# Patient Record
Sex: FEMALE | Race: WHITE
[De-identification: names, ages, dates, MRNs, and addresses within clinical notes are randomized per-mention and may not be internally consistent; named-entity substitution may affect disease eponyms.]

---

## 2017-06-05 ENCOUNTER — HOSPITAL ENCOUNTER (OUTPATIENT)
Dept: HOSPITAL 62 - WI | Age: 67
End: 2017-06-05
Attending: INTERNAL MEDICINE
Payer: MEDICARE

## 2017-06-05 DIAGNOSIS — C50.412: Primary | ICD-10-CM

## 2017-06-05 PROCEDURE — G0279 TOMOSYNTHESIS, MAMMO: HCPCS

## 2017-06-05 PROCEDURE — G0204 DX MAMMO INCL CAD BI: HCPCS

## 2017-06-05 PROCEDURE — 77066 DX MAMMO INCL CAD BI: CPT

## 2017-06-05 PROCEDURE — 77062 BREAST TOMOSYNTHESIS BI: CPT

## 2017-06-16 NOTE — WOMENS IMAGING REPORT
EXAM DESCRIPTION:  3D DX MAMMO BILAT



COMPLETED DATE/TIME:  6/5/2017 2:06 pm



REASON FOR STUDY:  ROUTINE SCREENING Z12.31 C50.412  MALIG NEOPLASM OF UPPER-OUTER QUADRANT OF LEFT F
EMAL



COMPARISON:  11/13/2012, 1/29/2008



TECHNIQUE:  Standard craniocaudal and mediolateral oblique views of each breast recorded using digita
l acquisition and breast tomosynthesis.



LIMITATIONS:  None.



FINDINGS:  Findings present which are benign by mammographic criteria.  No suspicious masses, calcifi
cations or architectural distortion.

Pertinent benign findings: Right breast benign calcifications, left axillary surgical clips with left
 breast skin thickening from breast radiation therapy.

Read with the assistance of CAD.

.[facility] - R2 Cenova Version 1.3

.[facility] - R2 Cenova Version 1.3

.[facility] - R2 Cenova Version 2.4

.[facility] - R2 Cenova Version 2.4

.[facility] - R2  Version 9.2

Benign mammographic findings may include one or more of the following:  Smooth masses, popcorn/rim/co
arse calcifications, asymmetries, post-procedure changes, and lesions with long-standing stability.



IMPRESSION:  BENIGN MAMMOGRAPHIC FINDINGS.  BIRADS 2



BREAST DENSITY:  b. There are scattered areas of fibroglandular density.



BIRAD:  2 BENIGN FINDING(S)



RECOMMENDATION:  RECOMMENDATION: ROUTINE SCREENING



COMMENT:  The patient has been notified of the results by letter per SA requirements. Additional no
tification policies are in place for contacting patient with suspicious or incomplete findings.

Quality ID #225: The American College of Radiology recommends an annual screening mammogram for women
 aged 40 years or over. This facility utilizes a reminder system to ensure that all patients receive 
reminder letters, and/or direct phone calls for appointments. This includes reminders for routine scr
eening mammograms, diagnostic mammograms, or other Breast Imaging Interventions when appropriate.  Th
is patient will be placed in the appropriate reminder system.

The American College of Radiology (ACR) has developed recommendations for screening MRI of the breast
s in certain patient populations, to be used in conjunction with mammography.  Breast MRI surveillanc
e may be appropriate for women with more than 20% lifetime risk of developing breast cancer  as deter
mined by genetic testing, significant family history of the disease, or history of mantle radiation f
or Hodgkins Disease.  ACR Practice Guidelines 2008.

[employer]



PQRS 6045F: Fluoroscopic imaging is not utilized for breast tomosynthesis.



TECHNICAL DOCUMENTATION:  FINDING NUMBER: (1)

ASSESSMENT: (1)

JOB ID:  [id number]

 2011 [employer]- All Rights Reserved

## 2017-12-28 ENCOUNTER — HOSPITAL ENCOUNTER (OUTPATIENT)
Dept: HOSPITAL 62 - SC | Age: 67
Discharge: HOME | End: 2017-12-28
Attending: OPHTHALMOLOGY
Payer: MEDICARE

## 2017-12-28 DIAGNOSIS — I10: ICD-10-CM

## 2017-12-28 DIAGNOSIS — E11.9: ICD-10-CM

## 2017-12-28 DIAGNOSIS — Z85.3: ICD-10-CM

## 2017-12-28 DIAGNOSIS — H25.12: Primary | ICD-10-CM

## 2017-12-28 DIAGNOSIS — K21.9: ICD-10-CM

## 2017-12-28 DIAGNOSIS — Z79.84: ICD-10-CM

## 2017-12-28 DIAGNOSIS — Z88.0: ICD-10-CM

## 2017-12-28 DIAGNOSIS — Z79.82: ICD-10-CM

## 2017-12-28 PROCEDURE — V2632 POST CHMBR INTRAOCULAR LENS: HCPCS

## 2017-12-28 PROCEDURE — 82962 GLUCOSE BLOOD TEST: CPT

## 2017-12-28 PROCEDURE — 08RK3JZ REPLACEMENT OF LEFT LENS WITH SYNTHETIC SUBSTITUTE, PERCUTANEOUS APPROACH: ICD-10-PCS | Performed by: OPHTHALMOLOGY

## 2017-12-28 PROCEDURE — 66984 XCAPSL CTRC RMVL W/O ECP: CPT

## 2017-12-28 RX ADMIN — TETRACAINE HYDROCHLORIDE PRN DROP: 5 SOLUTION OPHTHALMIC at 08:12

## 2017-12-28 RX ADMIN — EPINEPHRINE ONE MG: 1 INJECTION, SOLUTION, CONCENTRATE INTRAVENOUS at 00:00

## 2017-12-28 RX ADMIN — CYCLOPENTOLATE HYDROCHLORIDE AND PHENYLEPHRINE HYDROCHLORIDE PRN DROP: 2; 10 SOLUTION/ DROPS OPHTHALMIC at 08:12

## 2017-12-28 RX ADMIN — TROPICAMIDE PRN DROP: 10 SOLUTION/ DROPS OPHTHALMIC at 07:52

## 2017-12-28 RX ADMIN — TROPICAMIDE PRN DROP: 10 SOLUTION/ DROPS OPHTHALMIC at 08:12

## 2017-12-28 RX ADMIN — SODIUM CHONDROITIN SULFATE / SODIUM HYALURONATE ONE EACH: 0.55-0.5 INJECTION INTRAOCULAR at 00:00

## 2017-12-28 RX ADMIN — CYCLOPENTOLATE HYDROCHLORIDE AND PHENYLEPHRINE HYDROCHLORIDE PRN DROP: 2; 10 SOLUTION/ DROPS OPHTHALMIC at 07:32

## 2017-12-28 RX ADMIN — BESIFLOXACIN PRN DROP: 6 SUSPENSION OPHTHALMIC at 07:33

## 2017-12-28 RX ADMIN — TETRACAINE HYDROCHLORIDE PRN DROP: 5 SOLUTION OPHTHALMIC at 07:34

## 2017-12-28 RX ADMIN — TETRACAINE HYDROCHLORIDE PRN DROP: 5 SOLUTION OPHTHALMIC at 08:32

## 2017-12-28 RX ADMIN — BESIFLOXACIN PRN DROP: 6 SUSPENSION OPHTHALMIC at 00:00

## 2017-12-28 RX ADMIN — BESIFLOXACIN PRN DROP: 6 SUSPENSION OPHTHALMIC at 07:52

## 2017-12-28 RX ADMIN — TROPICAMIDE PRN DROP: 10 SOLUTION/ DROPS OPHTHALMIC at 07:32

## 2017-12-28 RX ADMIN — BESIFLOXACIN PRN DROP: 6 SUSPENSION OPHTHALMIC at 08:57

## 2017-12-28 RX ADMIN — SODIUM CHONDROITIN SULFATE / SODIUM HYALURONATE ONE EACH: 0.55-0.5 INJECTION INTRAOCULAR at 08:44

## 2017-12-28 RX ADMIN — HEPARIN SODIUM ONE ML: 1000 INJECTION, SOLUTION INTRAVENOUS; SUBCUTANEOUS at 00:00

## 2017-12-28 RX ADMIN — TETRACAINE HYDROCHLORIDE PRN DROP: 5 SOLUTION OPHTHALMIC at 00:00

## 2017-12-28 RX ADMIN — EPINEPHRINE ONE MG: 1 INJECTION, SOLUTION, CONCENTRATE INTRAVENOUS at 08:44

## 2017-12-28 RX ADMIN — HEPARIN SODIUM ONE ML: 1000 INJECTION, SOLUTION INTRAVENOUS; SUBCUTANEOUS at 08:44

## 2017-12-28 RX ADMIN — CYCLOPENTOLATE HYDROCHLORIDE AND PHENYLEPHRINE HYDROCHLORIDE PRN DROP: 2; 10 SOLUTION/ DROPS OPHTHALMIC at 07:52

## 2017-12-28 NOTE — [facility] OPERATIVE REPORT E
[facility] Operative Report



NAME: NÉSTOR QUINTANA

                                      MRN: [id number]

                             AGE: 67Y

DATE OF SURGERY: 12/28/2017           ROOM:



PREOPERATIVE DIAGNOSIS:

CATARACT, LEFT EYE.



POSTOPERATIVE DIAGNOSIS:

CATARACT, LEFT EYE.



OPERATION:

Cataract extraction with intraocular lens implant of the left eye.



SURGEON:

BRITNI MCCALL M.D.



ANESTHESIA:

Topical.



PROCEDURE:

After obtaining appropriate consent, the patient's left eye was prepped and

draped in sterile fashion as well as the surgeon in a sterile manner and

cataract surgery was started.  First a paracentesis blade was used to make

a small side-port incision.  Viscoelastic was used to inflate the anterior

chamber.  Next a 2.4 mm incision was made with the paracentesis blade.  A

continuous capsulorrhexis incision was made using a cystotome and Utrata

forceps.  Following this hydrodissection was carried out to make the lens

fully loose and mobile and it was rotated 90 degrees.  Following this, a

divide-and-conquer technique was used to phacoemulsify the lens with a CDE

of 10.65. The remaining cortex was removed with irrigation/aspiration. 

Provisc was instilled into the capsular bag to inflate the bag.  A SN60WF,

14.0 diopter lens was placed.  The remaining viscoelastic material was

removed with irrigation/aspiration.  Following this, a 10-0 nylon suture

was used to close the incision and it was found to be watertight.  Vigamox

was instilled in the eye and a protective shield was placed over the eye. 

The patient returned to the postoperative recovery in stable condition.







DICTATING PHYSICIAN: BRITNI MCCALL M.D.



5020M              DT: 12/28/2017 2116

PHY#: 2011         DD: 12/28/2017 2108

ID:   [id number]               JOB#: [id number]       ACCT: [id number]



cc:BRITNI MCCALL M.D.

>

## 2017-12-28 NOTE — [facility]GIC[facility] DISCHARGE SUMMARY E
[facility] Discharge Summary



NAME: NÉSTOR QUINTANA

                                      MRN: [id number]

                                AGE: 67Y

ADMITTED: 12/28/2017           DISCHARGED: 12/28/2017



HOSPITAL COURSE:

This is a 67-year-old female who underwent cataract extraction of the left

eye.



DIAGNOSIS:

CATARACT, LEFT EYE.



The patient underwent surgery because she was having difficulty seeing road

signs.



DISCHARGE INSTRUCTIONS:

She is to be on a regular diet.  No bending at the waist, no heavy lifting.

She should use Besivance, Ilevro, and Durezol at 3 p.m. and 8 p.m. and

sleep with a rigid shield.  I will see her for a 1 day postoperative

tomorrow.





DICTATING PHYSICIAN: BRITNI MCCALL M.D.



5020M              DT: 12/28/2017 2117

PHY#: 2011         DD: 12/28/2017 2108

ID:   [id number]               JOB#: [id number]       ACCT: [id number]



cc:BRITNI MCCALL M.D.

>

## 2018-01-25 ENCOUNTER — HOSPITAL ENCOUNTER (OUTPATIENT)
Dept: HOSPITAL 62 - SC | Age: 68
Discharge: HOME | End: 2018-01-25
Attending: OPHTHALMOLOGY
Payer: MEDICARE

## 2018-01-25 DIAGNOSIS — Z96.1: ICD-10-CM

## 2018-01-25 DIAGNOSIS — H25.11: Primary | ICD-10-CM

## 2018-01-25 DIAGNOSIS — Z79.899: ICD-10-CM

## 2018-01-25 DIAGNOSIS — Z88.0: ICD-10-CM

## 2018-01-25 DIAGNOSIS — I10: ICD-10-CM

## 2018-01-25 DIAGNOSIS — Z79.84: ICD-10-CM

## 2018-01-25 DIAGNOSIS — K21.9: ICD-10-CM

## 2018-01-25 DIAGNOSIS — Z79.82: ICD-10-CM

## 2018-01-25 DIAGNOSIS — Z85.3: ICD-10-CM

## 2018-01-25 DIAGNOSIS — E11.9: ICD-10-CM

## 2018-01-25 PROCEDURE — 08RJ3JZ REPLACEMENT OF RIGHT LENS WITH SYNTHETIC SUBSTITUTE, PERCUTANEOUS APPROACH: ICD-10-PCS | Performed by: OPHTHALMOLOGY

## 2018-01-25 PROCEDURE — 82962 GLUCOSE BLOOD TEST: CPT

## 2018-01-25 PROCEDURE — 66984 XCAPSL CTRC RMVL W/O ECP: CPT

## 2018-01-25 PROCEDURE — V2632 POST CHMBR INTRAOCULAR LENS: HCPCS

## 2018-01-25 RX ADMIN — CYCLOPENTOLATE HYDROCHLORIDE AND PHENYLEPHRINE HYDROCHLORIDE PRN DROP: 2; 10 SOLUTION/ DROPS OPHTHALMIC at 07:26

## 2018-01-25 RX ADMIN — TETRACAINE HYDROCHLORIDE PRN DROP: 5 SOLUTION OPHTHALMIC at 07:15

## 2018-01-25 RX ADMIN — BESIFLOXACIN PRN DROP: 6 SUSPENSION OPHTHALMIC at 08:15

## 2018-01-25 RX ADMIN — TROPICAMIDE PRN DROP: 10 SOLUTION/ DROPS OPHTHALMIC at 07:26

## 2018-01-25 RX ADMIN — TETRACAINE HYDROCHLORIDE PRN DROP: 5 SOLUTION OPHTHALMIC at 07:55

## 2018-01-25 RX ADMIN — TETRACAINE HYDROCHLORIDE PRN DROP: 5 SOLUTION OPHTHALMIC at 07:43

## 2018-01-25 RX ADMIN — BESIFLOXACIN PRN DROP: 6 SUSPENSION OPHTHALMIC at 07:16

## 2018-01-25 RX ADMIN — BESIFLOXACIN PRN DROP: 6 SUSPENSION OPHTHALMIC at 07:30

## 2018-01-25 RX ADMIN — TROPICAMIDE PRN DROP: 10 SOLUTION/ DROPS OPHTHALMIC at 07:40

## 2018-01-25 RX ADMIN — BESIFLOXACIN PRN DROP: 6 SUSPENSION OPHTHALMIC at 00:00

## 2018-01-25 RX ADMIN — TROPICAMIDE PRN DROP: 10 SOLUTION/ DROPS OPHTHALMIC at 07:16

## 2018-01-25 RX ADMIN — BESIFLOXACIN PRN DROP: 6 SUSPENSION OPHTHALMIC at 07:26

## 2018-01-25 RX ADMIN — CYCLOPENTOLATE HYDROCHLORIDE AND PHENYLEPHRINE HYDROCHLORIDE PRN DROP: 2; 10 SOLUTION/ DROPS OPHTHALMIC at 07:16

## 2018-01-25 RX ADMIN — CYCLOPENTOLATE HYDROCHLORIDE AND PHENYLEPHRINE HYDROCHLORIDE PRN DROP: 2; 10 SOLUTION/ DROPS OPHTHALMIC at 07:40

## 2018-01-25 NOTE — [facility] DISCHARGE SUMMARY E
[facility] Discharge Summary



NAME: NÉSTOR QUINTANA

                                      MRN: [id number]

                                AGE: 67Y

ADMITTED: 01/25/2018           DISCHARGED: 01/25/2018





HOSPITAL COURSE:

This is a 67-year-old patient who underwent cataract extraction of the

right eye.



DIAGNOSIS:

CATARACT, right  EYE.



She underwent surgery because she was having difficulty reading and

watching TV.



DISCHARGE INSTRUCTIONS:

She should be on a regular diet.  No bending at her waist, no heavy

lifting.  She should use Besivance, Ilevro, and Durezol at 3 p.m. and 8

p.m. and sleep with a rigid shield.  I will see her for her 1 day

postoperative tomorrow.





DICTATING PHYSICIAN: BRITNI MCCALL M.D.



5020M              DT: 01/25/2018 2140

PHY#: 2011         DD: 01/25/2018 2119

ID:   [id number]               JOB#: [id number]       ACCT: [id number]



cc:BRITNI MCCALL M.D.

>

## 2018-01-25 NOTE — [facility] OPERATIVE REPORT E
[facility] Operative Report



NAME: NÉSTOR QUINTANA

                                      MRN: [id number]

                             AGE: 67Y

DATE OF SURGERY: 01/25/2018            ROOM:



PREOPERATIVE DIAGNOSIS:

CATARACT, RIGHT EYE.



POSTOPERATIVE DIAGNOSIS:

CATARACT, RIGHT EYE.



OPERATION:

Cataract extraction with intraocular lens implant of the right eye.



SURGEON:

BRITNI MCCALL M.D.



ANESTHESIA:

Topical.



PROCEDURE:

After obtaining appropriate consent, the patient's right eye was prepped

and draped in sterile fashion as well as the surgeon in a sterile manner

and cataract surgery was started.  First a paracentesis blade was used to

make a small side-port incision.  Viscoelastic was used to inflate the

anterior chamber.  Next a 2.4 mm incision was made with the paracentesis

blade.  A continuous capsulorrhexis incision was made using a cystotome and

Utrata forceps.  Following this hydrodissection was carried out to make the

lens fully loose and mobile and it was rotated 90 degrees.  Following this,

a divide-and-conquer technique was used to phacoemulsify the lens with a

CDE of 5.52. The remaining cortex was removed with irrigation/aspiration. 

Provisc was instilled into the capsular bag to inflate the bag.  A SN60WF,

12.5 diopter lens was placed.  The remaining viscoelastic material was

removed with irrigation/aspiration.  Following this, a 10-0 nylon suture

was used to close the incision and it was found to be watertight.  Vigamox

was instilled in the eye and a protective shield was placed over the eye. 

The patient returned to the postoperative recovery in stable condition.









DICTATING PHYSICIAN: BRITNI MCCALL M.D.



5020M              DT: 01/25/2018 2139

PHY#: 2011         DD: 01/25/2018 2119

ID:   [id number]               JOB#: [id number]       ACCT: [id number]



cc:BRITNI MCCALL M.D.

>

## 2018-04-17 ENCOUNTER — HOSPITAL ENCOUNTER (OUTPATIENT)
Dept: HOSPITAL 62 - WI | Age: 68
End: 2018-04-17
Attending: PHYSICIAN ASSISTANT
Payer: MEDICARE

## 2018-04-17 DIAGNOSIS — C50.412: Primary | ICD-10-CM

## 2018-04-17 DIAGNOSIS — N60.02: ICD-10-CM

## 2018-04-17 PROCEDURE — 77066 DX MAMMO INCL CAD BI: CPT

## 2018-04-17 PROCEDURE — 76642 ULTRASOUND BREAST LIMITED: CPT

## 2018-04-17 PROCEDURE — 77062 BREAST TOMOSYNTHESIS BI: CPT

## 2018-04-17 PROCEDURE — G0279 TOMOSYNTHESIS, MAMMO: HCPCS

## 2018-04-17 NOTE — WOMENS IMAGING REPORT
EXAM DESCRIPTION:  3D DX MAMMO BILAT; U/S BREAST UNILAT LIMITED



COMPLETED DATE/TIME:  4/17/2018 10:38 am; 4/17/2018 1:03 pm



REASON FOR STUDY:  BREAST CA; C50.412; LEFT BREAST LUMP C50.412 C50.412  MALIG NEOPLASM OF UPPER-OUTE
R QUADRANT OF LEFT FEMAL



COMPARISON:  Bilateral screening tomosynthesis 6/5/2017



TECHNIQUE:  Standard craniocaudal and mediolateral oblique views of each breast recorded using digita
l acquisition and breast tomosynthesis.

Additional left breast compression cone views of the palpable nodule left breast periareolar 12 o'romy
ck position.  Additional left breast 90 mediolateral view

Left breast ultrasound was also performed



LIMITATIONS:  None.



FINDINGS:  RIGHT BREAST

MASSES: No worrisome masses

CALCIFICATIONS: No new or suspicious calcifications.

ARCHITECTURAL DISTORTION: None.

DEVELOPING DENSITY: None.

ASYMMETRY: None noted.

OTHER: No other significant findings.

LEFT BREAST

MASSES: In the left breast periareolar region 12 o'clock position, an oil cyst is present just deep t
o the skin measuring about 11 mm in size.  Immediately adjacent, another oil cyst is present measurin
g about 14 mm in size

CALCIFICATIONS: No new or suspicious calcifications.

ARCHITECTURAL DISTORTION: None.

DEVELOPING DENSITY: None.

ASYMMETRY: None noted.

OTHER: There is diffuse left breast skin thickening from prior radiation therapy.

Read with the assistance of CAD:

.[facility] Cenova Version 1.3

.[facility] - R2 Cenova Version 1.3

.[facility] - R2 Cenova Version 2.4

.[facility] [place] R2 Cenova Version 2.4

.[facility] - R2  Version 9.2

Left breast ultrasound:

Left breast ultrasound was performed along the 12 o'clock periareolar region.  There are 2 1 oil cyst
 adjacent to each other, 11 mm and 14 mm in size.  This correlates with the tomosynthesis images and 
is in the area of palpable abnormality indicated by the patient.



IMPRESSION:  No mammographic/tomosynthesis evidence for malignancy right breast

No mammographic/ tomosynthesis or ultrasound evidence for malignancy left breast.  Left periareolar p
alpable nodules correlate with fat necrosis



BREAST DENSITY:  c. The breasts are heterogeneously dense, which may obscure small masses.



BIRAD:  2 Benign findings.



RECOMMENDATION:  RECOMMENDED FOLLOW UP: Please continue yearly bilateral screening mammography/ tomos
ynthesis in April 2019

SPECIFIC INTERVENTION/IMAGING/CONSULTATION RECOMMENDED:No additional intervention/ imaging/consultati
on needed at this time.

COMMUNICATION:Patient notified by letter



COMMENT:  The patient has been notified of the results by letter per SA requirements. Additional no
tification policies are in place for contacting patient with suspicious or incomplete findings.

Quality ID #225: The American College of Radiology recommends an annual screening mammogram for women
 aged 40 years or over. This facility utilizes a reminder system to ensure that all patients receive 
reminder letters, and/or direct phone calls for appointments. This includes reminders for routine scr
eening mammograms, diagnostic mammograms, or other Breast Imaging Interventions when appropriate.  Th
is patient will be placed in the appropriate reminder system.

The American College of Radiology (ACR) has developed recommendations for screening MRI of the breast
s in certain patient populations, to be used in conjunction with mammography.  Breast MRI surveillanc
e may be appropriate for women with more than 20% lifetime risk of developing breast cancer  as deter
mined by genetic testing, significant family history of the disease, or history of mantle radiation f
or Hodgkins Disease.  ACR Practice Guidelines 2008.

[employer]



PQRS 6045F:  Fluoroscopic imaging is not utilized for breast tomosynthesis.



TECHNICAL DOCUMENTATION:  FINDING NUMBER: (1)

ASSESSMENT: (1)

JOB ID:  [id number]

 2011 [employer]- All Rights Reserved



Reading location - IP/workstation name: [facility]

## 2018-04-17 NOTE — WOMENS IMAGING REPORT
EXAM DESCRIPTION:  3D DX MAMMO BILAT; U/S BREAST UNILAT LIMITED



COMPLETED DATE/TIME:  4/17/2018 10:38 am; 4/17/2018 1:03 pm



REASON FOR STUDY:  BREAST CA; C50.412; LEFT BREAST LUMP C50.412 C50.412  MALIG NEOPLASM OF UPPER-OUTE
R QUADRANT OF LEFT FEMAL



COMPARISON:  Bilateral screening tomosynthesis 6/5/2017



TECHNIQUE:  Standard craniocaudal and mediolateral oblique views of each breast recorded using digita
l acquisition and breast tomosynthesis.

Additional left breast compression cone views of the palpable nodule left breast periareolar 12 o'romy
ck position.  Additional left breast 90 mediolateral view

Left breast ultrasound was also performed



LIMITATIONS:  None.



FINDINGS:  RIGHT BREAST

MASSES: No worrisome masses

CALCIFICATIONS: No new or suspicious calcifications.

ARCHITECTURAL DISTORTION: None.

DEVELOPING DENSITY: None.

ASYMMETRY: None noted.

OTHER: No other significant findings.

LEFT BREAST

MASSES: In the left breast periareolar region 12 o'clock position, an oil cyst is present just deep t
o the skin measuring about 11 mm in size.  Immediately adjacent, another oil cyst is present measurin
g about 14 mm in size

CALCIFICATIONS: No new or suspicious calcifications.

ARCHITECTURAL DISTORTION: None.

DEVELOPING DENSITY: None.

ASYMMETRY: None noted.

OTHER: There is diffuse left breast skin thickening from prior radiation therapy.

Read with the assistance of CAD:

[facility] Cenova Version 1.3

.[facility] - R2 Cenova Version 1.3

.[facility] - R2 Cenova Version 2.4

.[facility] - R2 Cenova Version 2.4

.[facility] - R2  Version 9.2

Left breast ultrasound:

Left breast ultrasound was performed along the 12 o'clock periareolar region.  There are 2 1 oil cyst
 adjacent to each other, 11 mm and 14 mm in size.  This correlates with the tomosynthesis images and 
is in the area of palpable abnormality indicated by the patient.



IMPRESSION:  No mammographic/tomosynthesis evidence for malignancy right breast

No mammographic/ tomosynthesis or ultrasound evidence for malignancy left breast.  Left periareolar p
alpable nodules correlate with fat necrosis



BREAST DENSITY:  c. The breasts are heterogeneously dense, which may obscure small masses.



BIRAD:  2 Benign findings.



RECOMMENDATION:  RECOMMENDED FOLLOW UP: Please continue yearly bilateral screening mammography/ tomos
ynthesis in April 2019

SPECIFIC INTERVENTION/IMAGING/CONSULTATION RECOMMENDED:No additional intervention/ imaging/consultati
on needed at this time.

COMMUNICATION:Patient notified by letter



COMMENT:  The patient has been notified of the results by letter per SA requirements. Additional no
tification policies are in place for contacting patient with suspicious or incomplete findings.

Quality ID #225: The American College of Radiology recommends an annual screening mammogram for women
 aged 40 years or over. This facility utilizes a reminder system to ensure that all patients receive 
reminder letters, and/or direct phone calls for appointments. This includes reminders for routine scr
eening mammograms, diagnostic mammograms, or other Breast Imaging Interventions when appropriate.  Th
is patient will be placed in the appropriate reminder system.

The American College of Radiology (ACR) has developed recommendations for screening MRI of the breast
s in certain patient populations, to be used in conjunction with mammography.  Breast MRI surveillanc
e may be appropriate for women with more than 20% lifetime risk of developing breast cancer  as deter
mined by genetic testing, significant family history of the disease, or history of mantle radiation f
or Hodgkins Disease.  ACR Practice Guidelines 2008.

[employer]



PQRS 6045F:  Fluoroscopic imaging is not utilized for breast tomosynthesis.



TECHNICAL DOCUMENTATION:  FINDING NUMBER: (1)

ASSESSMENT: (1)

JOB ID:  [id number]

 2011 [employer] All Rights Reserved



Reading location - IP/workstation name: [facility]

## 2019-04-23 ENCOUNTER — HOSPITAL ENCOUNTER (OUTPATIENT)
Dept: HOSPITAL 62 - WI | Age: 69
End: 2019-04-23
Attending: INTERNAL MEDICINE
Payer: MEDICARE

## 2019-04-23 DIAGNOSIS — C50.412: Primary | ICD-10-CM

## 2019-04-23 PROCEDURE — 77062 BREAST TOMOSYNTHESIS BI: CPT

## 2019-04-23 PROCEDURE — G0279 TOMOSYNTHESIS, MAMMO: HCPCS

## 2019-04-23 PROCEDURE — 77066 DX MAMMO INCL CAD BI: CPT

## 2019-04-23 NOTE — WOMENS IMAGING REPORT
EXAM DESCRIPTION:  3D DX MAMMO BILAT



COMPLETED DATE/TIME:  4/23/2019 9:28 am



REASON FOR STUDY:  C50.412 MALIGNANT NEOPLASM OF UPPER-OUTER QUADRANT OF LEFT FEMALE BREAST C50.412  
MALIG NEOPLASM OF UPPER-OUTER QUADRANT OF LEFT FEMAL



COMPARISON:  4/17/2018 and 6/5/2017.



TECHNIQUE:  Standard craniocaudal and mediolateral oblique views of each breast recorded using digita
l acquisition and breast tomosynthesis.

Additional true lateral images of the left breast acquired with tomosynthesis.



LIMITATIONS:  None.



FINDINGS:  RIGHT BREAST

MASSES: No suspicious masses.

CALCIFICATIONS: Stable calcifications.  No new or suspicious calcifications.

ARCHITECTURAL DISTORTION: None.

DEVELOPING DENSITY: None.

ASYMMETRY: None noted.

OTHER: No other significant findings.

LEFT BREAST

MASSES: No suspicious masses.

CALCIFICATIONS: Postsurgical dystrophic calcifications.

ARCHITECTURAL DISTORTION: Stable surgical changes.

DEVELOPING DENSITY: None.

ASYMMETRY: None noted.

OTHER: No other significant finding.

Read with the assistance of CAD:

.[facility] - R2 Cenova Version 1.3

.[facility] - R2 Cenova Version 2.1

.\[facility]\"" Imaging - R2 Cenova Version 2.4

.[facility] - R2 Cenova Version 2.4

.[facility] - R2  Version 9.2



IMPRESSION:  Stable mammographic appearance of both breasts.  Stable surgical changes in left breast.
  No worrisome findings.



BREAST DENSITY:  c. The breasts are heterogeneously dense, which may obscure small masses.



BIRAD:  2 Benign findings.



RECOMMENDATION:  RECOMMENDED FOLLOW UP: Birads 1 or 2:  Annual mammography.

SPECIFIC INTERVENTION/IMAGING/CONSULTATION RECOMMENDED:No additional intervention/ imaging/consultati
on needed at this time.

COMMUNICATION:The imaging findings were not discussed with the patient. Her referring provider has be
en notified of the findings.



COMMENT:  The patient has been notified of the results by letter per SA requirements. Additional no
tification policies are in place for contacting patient with suspicious or incomplete findings.

Quality ID #225: The American College of Radiology recommends an annual screening mammogram for women
 aged 40 years or over. This facility utilizes a reminder system to ensure that all patients receive 
reminder letters, and/or direct phone calls for appointments. This includes reminders for routine scr
eening mammograms, diagnostic mammograms, or other Breast Imaging Interventions when appropriate.  Th
is patient will be placed in the appropriate reminder system.

The American College of Radiology (ACR) has developed recommendations for screening MRI of the breast
s in certain patient populations, to be used in conjunction with mammography.  Breast MRI surveillanc
e may be appropriate for women with more than 20% lifetime risk of developing breast cancer  as deter
mined by genetic testing, significant family history of the disease, or history of mantle radiation f
or Hodgkins Disease.  ACR Practice Guidelines 2008.

[employer]



PQRS 6045F:  Fluoroscopic imaging is not utilized for breast tomosynthesis.



TECHNICAL DOCUMENTATION:  FINDING NUMBER: (1)

ASSESSMENT: (1)

JOB ID:  [id number]

 2011 [employer]- All Rights Reserved



Reading location - IP/workstation name: [email or web address]

## 2019-09-24 ENCOUNTER — HOSPITAL ENCOUNTER (OUTPATIENT)
Dept: HOSPITAL 62 - WI | Age: 69
End: 2019-09-24
Payer: MEDICARE

## 2019-09-24 DIAGNOSIS — N63.21: Primary | ICD-10-CM

## 2019-09-24 PROCEDURE — 76642 ULTRASOUND BREAST LIMITED: CPT

## 2019-09-24 NOTE — WOMENS IMAGING REPORT
EXAM DESCRIPTION:  LEFT DIAGNOSTIC MAMMO W/CAD; U/S BREAST UNILAT LIMITED



COMPLETED DATE/TIME:  9/24/2019 1:18 pm; 9/24/2019 1:47 pm



REASON FOR STUDY:  N63.21 UNSPECIFIED LUMP; LEFT BREAST LUMPS N63.21  UNSPECIFIED LUMP IN THE LEFT BR
EAST, UPPER OUTER QUAD



COMPARISON:  Multiple since 2017



EXAM PARAMETERS:  Standard craniocaudal, 90 mediolateral and mediolateral oblique images of the dory
st recorded with digital acquisition.

Left breast ultrasound was also performed

Read with the assistance of CAD.

.[facility] - R2  Version 9.2



LIMITATIONS:  None.



FINDINGS:  BREAST LATERALITY: Left

MASSES: In the left breast 12 o'clock periareolar position, patient indicates a palpable mass.  Deep 
to the palpable marker, peripherally calcified oil cysts are present mammographically.

CALCIFICATIONS: No new or suspicious calcifications.

ARCHITECTURAL DISTORTION: None.

DEVELOPING DENSITY: None.

ASYMMETRY: None noted.

OTHER: The diffuse left breast skin thickening post radiation

Left breast ultrasound:

Ultrasound was performed in the area of palpable abnormality 12 o'clock position periareolar.  Benign
-appearing peripherally calcified oil cysts are present, 12 x 7 mm in size, 10 x 6 mm in size.  There
 is diffuse skin thickening, 6 to 7 mm in thickness in the area of oil cysts.  This likely represents
 post radiation change.



IMPRESSION:  No mammographic or sonographic evidence for malignancy left breast.  Palpable abnormalit
y correlates with peripherally calcifying oil cysts in the periareolar region.

Patient is due for right breast screening mammograms in April 2020



BREAST DENSITY:  b. There are scattered areas of fibroglandular density.



BIRAD:  ASSESSMENT:  2 Benign findings.



RECOMMENDATION:  RECOMMENDED FOLLOW UP: Please continue yearly bilateral mammograms/tomosynthesis in 
April 2020.

SPECIFIC INTERVENTION/IMAGING/CONSULTATION RECOMMENDED:No additional intervention/ imaging/consultati
on needed at this time.

COMMUNICATION:Patient notified by letter



COMMENT:  The patient has been notified of the results by letter per MQSA requirements. Additional no
tification policies are in place for contacting patient with suspicious or incomplete findings.

Quality ID #225: The American College of Radiology recommends an annual screening mammogram for women
 aged 40 years or over. This facility utilizes a reminder system to ensure that all patients receive 
reminder letters, and/or direct phone calls for appointments. This includes reminders for routine scr
eening mammograms, diagnostic mammograms, or other Breast Imaging Interventions when appropriate.  Th
is patient will be placed in the appropriate reminder system.



TECHNICAL DOCUMENTATION:  FINDING NUMBER: (1)

ASSESSMENT: (1)

JOB ID:  [id number]

 2011 [employer]- All Rights Reserved



Reading location - IP/workstation name: [facility]

## 2019-09-24 NOTE — WOMENS IMAGING REPORT
EXAM DESCRIPTION:  LEFT DIAGNOSTIC MAMMO W/CAD; U/S BREAST UNILAT LIMITED



COMPLETED DATE/TIME:  9/24/2019 1:18 pm; 9/24/2019 1:47 pm



REASON FOR STUDY:  N63.21 UNSPECIFIED LUMP; LEFT BREAST LUMPS N63.21  UNSPECIFIED LUMP IN THE LEFT BR
EAST, UPPER OUTER QUAD



COMPARISON:  Multiple since 2017



EXAM PARAMETERS:  Standard craniocaudal, 90 mediolateral and mediolateral oblique images of the dory
st recorded with digital acquisition.

Left breast ultrasound was also performed

Read with the assistance of CAD.

.[facility] - R2  Version 9.2



LIMITATIONS:  None.



FINDINGS:  BREAST LATERALITY: Left

MASSES: In the left breast 12 o'clock periareolar position, patient indicates a palpable mass.  Deep 
to the palpable marker, peripherally calcified oil cysts are present mammographically.

CALCIFICATIONS: No new or suspicious calcifications.

ARCHITECTURAL DISTORTION: None.

DEVELOPING DENSITY: None.

ASYMMETRY: None noted.

OTHER: The diffuse left breast skin thickening post radiation

Left breast ultrasound:

Ultrasound was performed in the area of palpable abnormality 12 o'clock position periareolar.  Benign
-appearing peripherally calcified oil cysts are present, 12 x 7 mm in size, 10 x 6 mm in size.  There
 is diffuse skin thickening, 6 to 7 mm in thickness in the area of oil cysts.  This likely represents
 post radiation change.



IMPRESSION:  No mammographic or sonographic evidence for malignancy left breast.  Palpable abnormalit
y correlates with peripherally calcifying oil cysts in the periareolar region.

Patient is due for right breast screening mammograms in April 2020



BREAST DENSITY:  b. There are scattered areas of fibroglandular density.



BIRAD:  ASSESSMENT:  2 Benign findings.



RECOMMENDATION:  RECOMMENDED FOLLOW UP: Please continue yearly bilateral mammograms/tomosynthesis in 
April 2020.

SPECIFIC INTERVENTION/IMAGING/CONSULTATION RECOMMENDED:No additional intervention/ imaging/consultati
on needed at this time.

COMMUNICATION:Patient notified by letter



COMMENT:  The patient has been notified of the results by letter per MQSA requirements. Additional no
tification policies are in place for contacting patient with suspicious or incomplete findings.

Quality ID #225: The American College of [employer] recommends an annual screening mammogram for women
 aged 40 years or over. This facility utilizes a reminder system to ensure that all patients receive 
reminder letters, and/or direct phone calls for appointments. This includes reminders for routine scr
eening mammograms, diagnostic mammograms, or other Breast Imaging Interventions when appropriate.  Th
is patient will be placed in the appropriate reminder system.



TECHNICAL DOCUMENTATION:  FINDING NUMBER: (1)

ASSESSMENT: (1)

JOB ID:  [id number]

 2011 [employer]- All Rights Reserved



Reading location - IP/workstation name: [email or web address]

## 2020-01-31 ENCOUNTER — HOSPITAL ENCOUNTER (OUTPATIENT)
Dept: HOSPITAL 62 - WI | Age: 70
End: 2020-01-31
Attending: PHYSICIAN ASSISTANT
Payer: MEDICARE

## 2020-01-31 DIAGNOSIS — Z78.0: Primary | ICD-10-CM

## 2020-01-31 DIAGNOSIS — Z53.8: ICD-10-CM

## 2020-02-26 ENCOUNTER — HOSPITAL ENCOUNTER (OUTPATIENT)
Dept: HOSPITAL 62 - WI | Age: 70
End: 2020-02-26
Payer: MEDICARE

## 2020-02-26 DIAGNOSIS — D48.61: ICD-10-CM

## 2020-02-26 DIAGNOSIS — N60.02: Primary | ICD-10-CM

## 2020-02-26 PROCEDURE — 77062 BREAST TOMOSYNTHESIS BI: CPT

## 2020-02-26 PROCEDURE — G0279 TOMOSYNTHESIS, MAMMO: HCPCS

## 2020-02-26 PROCEDURE — 77066 DX MAMMO INCL CAD BI: CPT

## 2020-02-26 PROCEDURE — 76641 ULTRASOUND BREAST COMPLETE: CPT

## 2020-02-26 NOTE — WOMENS IMAGING REPORT
EXAM DESCRIPTION:  3D DX MAMMO BILAT; U/S BREAST UNILATERAL, COMPL



COMPLETED DATE/TIME:  2/26/2020 11:19 am; 2/26/2020 11:48 am



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
1 RIGHT BREAST; D48.61 LEFT BREAST D48.62  NEOPLASM OF UNCERTAIN BEHAVIOR OF LEFT BREAST Z85.3  PERSO
NAL HISTORY OF MALIGNANT NEOPLASM OF BREAST



COMPARISON:  None.



EXAM PARAMETERS:  Standard craniocaudal and mediolateral oblique views of each breast recorded using 
digital acquisition and breast tomosynthesis.

Additional true lateral images of both breasts acquired with tomosynthesis.

Read with the assistance of CAD:

.[employer]  Version 9.2



LIMITATIONS:  None.



FINDINGS:  RIGHT BREAST

MASSES: No suspicious masses.

CALCIFICATIONS: No new or suspicious calcifications.

ARCHITECTURAL DISTORTION: None.

ASYMMETRY: None noted.

OTHER: No other significant findings.

LEFT BREAST

MASSES: Stable circumscribed oil cysts in the superior breast.

CALCIFICATIONS: No new or suspicious calcifications.

ARCHITECTURAL DISTORTION: Stable surgical changes.

ASYMMETRY: None noted.

OTHER: Stable posttreatment skin thickening.

BREAST ULTRASOUND:

TECHNIQUE: Static and dynamic grayscale images acquired of the right and left breast in the specific 
areas of clinical/mammographic concern.  Periareolar right and left breasts. Selected color Doppler i
mages recorded.

ELASTOGRAPHY PERFORMED: No.

LIMITATIONS: None.

FINDINGS:

MASS: No abnormal sonographic findings in the right breast.  Stable calcified oil cysts in the subcut
aneous left breast along with skin thickening post radiation.  No suspicious mass identified.  Normal
 glandular tissue.

ELASTOGRAPHY CHARACTERISTICS: Not applicable.

OTHER: No other significant finding.



IMPRESSION:  Stable mammographic appearance of both breasts.  No worrisome mammographic or sonographi
c finding in either breast.



BREAST DENSITY:  c. The breasts are heterogeneously dense, which may obscure small masses.



BIRAD:  ASSESSMENT:  2 Benign findings.



RECOMMENDATION:  RECOMMENDED FOLLOW UP: Birads 1 or 2: The patient should resume routine screening .

SPECIFIC INTERVENTION/IMAGING/CONSULTATION RECOMMENDED:No additional intervention/ imaging/consultati
on needed at this time.

COMMUNICATION:The imaging findings were not discussed with the patient. Her referring provider has be
en notified of the findings.



COMMENT:  The patient has been notified of the results by letter per SA requirements. Additional no
tification policies are in place for contacting patient with suspicious or incomplete findings.

Quality ID #225: The American [employer] of [employer] recommends an annual screening mammogram for women
 aged 40 years or over. This facility utilizes a reminder system to ensure that all patients receive 
reminder letters, and/or direct phone calls for appointments. This includes reminders for routine scr
eening mammograms, diagnostic mammograms, or other Breast Imaging Interventions when appropriate.  Th
is patient will be placed in the appropriate reminder system.



TECHNICAL DOCUMENTATION:  FINDING NUMBER: (1)

ASSESSMENT: (1)

JOB ID:  [id number]

 2011 [id number]- All Rights Reserved



Reading location - IP/workstation name: [email or web address]

## 2020-02-26 NOTE — WOMENS IMAGING REPORT
EXAM DESCRIPTION:  3D DX MAMMO BILAT; U/S BREAST UNILATERAL, COMPL



COMPLETED DATE/TIME:  2/26/2020 11:19 am; 2/26/2020 11:48 am



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
1 RIGHT BREAST; D48.61 LEFT BREAST D48.62  NEOPLASM OF UNCERTAIN BEHAVIOR OF LEFT BREAST Z85.3  PERSO
NAL HISTORY OF MALIGNANT NEOPLASM OF BREAST



COMPARISON:  None.



EXAM PARAMETERS:  Standard craniocaudal and mediolateral oblique views of each breast recorded using 
digital acquisition and breast tomosynthesis.

Additional true lateral images of both breasts acquired with tomosynthesis.

Read with the assistance of CAD:

.[employer]  Version 9.2



LIMITATIONS:  None.



FINDINGS:  RIGHT BREAST

MASSES: No suspicious masses.

CALCIFICATIONS: No new or suspicious calcifications.

ARCHITECTURAL DISTORTION: None.

ASYMMETRY: None noted.

OTHER: No other significant findings.

LEFT BREAST

MASSES: Stable circumscribed oil cysts in the superior breast.

CALCIFICATIONS: No new or suspicious calcifications.

ARCHITECTURAL DISTORTION: Stable surgical changes.

ASYMMETRY: None noted.

OTHER: Stable posttreatment skin thickening.

BREAST ULTRASOUND:

TECHNIQUE: Static and dynamic grayscale images acquired of the right and left breast in the specific 
areas of clinical/mammographic concern.  Periareolar right and left breasts. Selected color Doppler i
mages recorded.

ELASTOGRAPHY PERFORMED: No.

LIMITATIONS: None.

FINDINGS:

MASS: No abnormal sonographic findings in the right breast.  Stable calcified oil cysts in the subcut
aneous left breast along with skin thickening post radiation.  No suspicious mass identified.  Normal
 glandular tissue.

ELASTOGRAPHY CHARACTERISTICS: Not applicable.

OTHER: No other significant finding.



IMPRESSION:  Stable mammographic appearance of both breasts.  No worrisome mammographic or sonographi
c finding in either breast.



BREAST DENSITY:  c. The breasts are heterogeneously dense, which may obscure small masses.



BIRAD:  ASSESSMENT:  2 Benign findings.



RECOMMENDATION:  RECOMMENDED FOLLOW UP: Birads 1 or 2: The patient should resume routine screening .

SPECIFIC INTERVENTION/IMAGING/CONSULTATION RECOMMENDED:No additional intervention/ imaging/consultati
on needed at this time.

COMMUNICATION:The imaging findings were not discussed with the patient. Her referring provider has be
en notified of the findings.



COMMENT:  The patient has been notified of the results by letter per SA requirements. Additional no
tification policies are in place for contacting patient with suspicious or incomplete findings.

Quality ID #225: The American [employer] of [employer]iology recommends an annual screening mammogram for women
 aged 40 years or over. This facility utilizes a reminder system to ensure that all patients receive 
reminder letters, and/or direct phone calls for appointments. This includes reminders for routine scr
eening mammograms, diagnostic mammograms, or other Breast Imaging Interventions when appropriate.  Th
is patient will be placed in the appropriate reminder system.



TECHNICAL DOCUMENTATION:  FINDING NUMBER: (1)

ASSESSMENT: (1)

JOB ID:  [id number]

 2011 [employer]- All Rights Reserved



Reading location - IP/workstation name: [email or web address]

## 2020-02-26 NOTE — WOMENS IMAGING REPORT
EXAM DESCRIPTION:  3D DX MAMMO BILAT; U/S BREAST UNILATERAL, COMPL



COMPLETED DATE/TIME:  2/26/2020 11:19 am; 2/26/2020 11:48 am



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
1 RIGHT BREAST; D48.61 LEFT BREAST D48.62  NEOPLASM OF UNCERTAIN BEHAVIOR OF LEFT BREAST Z85.3  PERSO
NAL HISTORY OF MALIGNANT NEOPLASM OF BREAST



COMPARISON:  None.



EXAM PARAMETERS:  Standard craniocaudal and mediolateral oblique views of each breast recorded using 
digital acquisition and breast tomosynthesis.

Additional true lateral images of both breasts acquired with tomosynthesis.

Read with the assistance of CAD:

.[employer]  Version 9.2



LIMITATIONS:  None.



FINDINGS:  RIGHT BREAST

MASSES: No suspicious masses.

CALCIFICATIONS: No new or suspicious calcifications.

ARCHITECTURAL DISTORTION: None.

ASYMMETRY: None noted.

OTHER: No other significant findings.

LEFT BREAST

MASSES: Stable circumscribed oil cysts in the superior breast.

CALCIFICATIONS: No new or suspicious calcifications.

ARCHITECTURAL DISTORTION: Stable surgical changes.

ASYMMETRY: None noted.

OTHER: Stable posttreatment skin thickening.

BREAST ULTRASOUND:

TECHNIQUE: Static and dynamic grayscale images acquired of the right and left breast in the specific 
areas of clinical/mammographic concern.  Periareolar right and left breasts. Selected color Doppler i
mages recorded.

ELASTOGRAPHY PERFORMED: No.

LIMITATIONS: None.

FINDINGS:

MASS: No abnormal sonographic findings in the right breast.  Stable calcified oil cysts in the subcut
aneous left breast along with skin thickening post radiation.  No suspicious mass identified.  Normal
 glandular tissue.

ELASTOGRAPHY CHARACTERISTICS: Not applicable.

OTHER: No other significant finding.



IMPRESSION:  Stable mammographic appearance of both breasts.  No worrisome mammographic or sonographi
c finding in either breast.



BREAST DENSITY:  c. The breasts are heterogeneously dense, which may obscure small masses.



BIRAD:  ASSESSMENT:  2 Benign findings.



RECOMMENDATION:  RECOMMENDED FOLLOW UP: Birads 1 or 2: The patient should resume routine screening .

SPECIFIC INTERVENTION/IMAGING/CONSULTATION RECOMMENDED:No additional intervention/ imaging/consultati
on needed at this time.

COMMUNICATION:The imaging findings were not discussed with the patient. Her referring provider has be
en notified of the findings.



COMMENT:  The patient has been notified of the results by letter per SA requirements. Additional no
tification policies are in place for contacting patient with suspicious or incomplete findings.

Quality ID #225: The American [employer] of [employer]iology recommends an annual screening mammogram for women
 aged 40 years or over. This facility utilizes a reminder system to ensure that all patients receive 
reminder letters, and/or direct phone calls for appointments. This includes reminders for routine scr
eening mammograms, diagnostic mammograms, or other Breast Imaging Interventions when appropriate.  Th
is patient will be placed in the appropriate reminder system.



TECHNICAL DOCUMENTATION:  FINDING NUMBER: (1)

ASSESSMENT: (1)

JOB ID:  [id number]

 2011 [employer]- All Rights Reserved



Reading location - IP/workstation name: [email or web address]